# Patient Record
Sex: FEMALE | Race: WHITE
[De-identification: names, ages, dates, MRNs, and addresses within clinical notes are randomized per-mention and may not be internally consistent; named-entity substitution may affect disease eponyms.]

---

## 2019-08-04 ENCOUNTER — HOSPITAL ENCOUNTER (EMERGENCY)
Dept: HOSPITAL 26 - MED | Age: 59
Discharge: LEFT BEFORE DISCHARGE | End: 2019-08-04
Payer: MEDICAID

## 2019-08-04 ENCOUNTER — HOSPITAL ENCOUNTER (EMERGENCY)
Dept: HOSPITAL 26 - MED | Age: 59
Discharge: HOME | End: 2019-08-04
Payer: MEDICAID

## 2019-08-04 VITALS — DIASTOLIC BLOOD PRESSURE: 70 MMHG | SYSTOLIC BLOOD PRESSURE: 116 MMHG

## 2019-08-04 VITALS — BODY MASS INDEX: 17.19 KG/M2 | WEIGHT: 97 LBS | HEIGHT: 63 IN

## 2019-08-04 VITALS — DIASTOLIC BLOOD PRESSURE: 77 MMHG | SYSTOLIC BLOOD PRESSURE: 116 MMHG

## 2019-08-04 DIAGNOSIS — F32.9: ICD-10-CM

## 2019-08-04 DIAGNOSIS — F41.9: Primary | ICD-10-CM

## 2019-08-04 DIAGNOSIS — Z53.21: ICD-10-CM

## 2020-09-12 ENCOUNTER — HOSPITAL ENCOUNTER (EMERGENCY)
Dept: HOSPITAL 26 - MED | Age: 60
Discharge: HOME | End: 2020-09-12
Payer: MEDICAID

## 2020-09-12 VITALS — SYSTOLIC BLOOD PRESSURE: 129 MMHG | DIASTOLIC BLOOD PRESSURE: 84 MMHG

## 2020-09-12 VITALS — HEIGHT: 62 IN | WEIGHT: 110 LBS | BODY MASS INDEX: 20.24 KG/M2

## 2020-09-12 VITALS — SYSTOLIC BLOOD PRESSURE: 134 MMHG | DIASTOLIC BLOOD PRESSURE: 89 MMHG

## 2020-09-12 DIAGNOSIS — F41.9: ICD-10-CM

## 2020-09-12 DIAGNOSIS — R42: Primary | ICD-10-CM

## 2020-09-12 DIAGNOSIS — F32.9: ICD-10-CM

## 2020-09-12 NOTE — NUR
c/o anxiety----sudden onset of dizziness, weakness to knees x today

pt admits she feels this way everyday---denies si/hi or visual/auditory 
hallucinations

## 2020-09-12 NOTE — NUR
-------------------------------------------------------------------------------

            *** Note jaimeone in EDM - 09/12/20 at 1444 by YAKOV ***             

-------------------------------------------------------------------------------

Patient discharged with v/s stable. Written and verbal after care instructions 
given and explained. 

Patient alert, oriented and verbalized understanding of instructions. 
Ambulatory with steady gait. All questions addressed prior to discharge. ID 
band removed. Patient advised to follow up with PMD. Rx of XANAX given. Patient 
educated on indication of medication including possible reaction and side 
effects. Opportunity to ask questions provided and answered.

## 2020-12-17 ENCOUNTER — HOSPITAL ENCOUNTER (EMERGENCY)
Dept: HOSPITAL 26 - MED | Age: 60
Discharge: HOME | End: 2020-12-17
Payer: MEDICAID

## 2020-12-17 VITALS — DIASTOLIC BLOOD PRESSURE: 61 MMHG | SYSTOLIC BLOOD PRESSURE: 115 MMHG

## 2020-12-17 VITALS — WEIGHT: 108 LBS | BODY MASS INDEX: 19.14 KG/M2 | HEIGHT: 63 IN

## 2020-12-17 DIAGNOSIS — F32.9: ICD-10-CM

## 2020-12-17 DIAGNOSIS — F41.9: ICD-10-CM

## 2020-12-17 DIAGNOSIS — K64.8: Primary | ICD-10-CM

## 2020-12-17 NOTE — NUR
59 YO F C/O RECTAL BLEEDING X 2 DAYS. PT ALSO REPORTS LOWER ABDOMINAL CRAMPING 
2/10. DENIES N/V/D. NO URINARY SYMPTOMS REPORTED. IN ED, VSS. AOX4. CLEAR 
BREATH SOUNDS. ABDOMEN SOFT NONTENDER, ACTIVE BOWEL SOUNDS ON ALL QUADRANTS. PT 
SITTING ON BED. ERMD MADE AWARE OF PT STATUS.





PMH: DEPRESSION, ANXIETY, PANIC DO

NKA

## 2020-12-17 NOTE — NUR
Patient discharged with v/s stable. Written and verbal after care instructions 
given and explained. 

Patient alert, oriented and verbalized understanding of instructions. 
Ambulatory with steady gait. All questions addressed prior to discharge. ID 
band removed. Patient advised to follow up with PMD. Rx of Hydrocortisone 
Acetate, Ranitidine given. Patient educated on indication of medication 
including possible reaction and side effects. Opportunity to ask questions 
provided and answered.

## 2022-07-17 ENCOUNTER — HOSPITAL ENCOUNTER (EMERGENCY)
Dept: HOSPITAL 26 - MED | Age: 62
Discharge: HOME | End: 2022-07-17
Payer: MEDICAID

## 2022-07-17 VITALS — WEIGHT: 131 LBS | BODY MASS INDEX: 23.21 KG/M2 | HEIGHT: 63 IN

## 2022-07-17 VITALS — SYSTOLIC BLOOD PRESSURE: 118 MMHG | DIASTOLIC BLOOD PRESSURE: 73 MMHG

## 2022-07-17 DIAGNOSIS — K85.90: Primary | ICD-10-CM

## 2022-07-17 LAB
ALBUMIN FLD-MCNC: 3.4 G/DL (ref 3.4–5)
ANION GAP SERPL CALCULATED.3IONS-SCNC: 14.2 MMOL/L (ref 8–16)
AST SERPL-CCNC: 20 U/L (ref 15–37)
BASOPHILS # BLD AUTO: 0 K/UL (ref 0–0.22)
BASOPHILS NFR BLD AUTO: 0.1 % (ref 0–2)
BILIRUB SERPL-MCNC: 0.5 MG/DL (ref 0–1)
BUN SERPL-MCNC: 17 MG/DL (ref 7–18)
CHLORIDE SERPL-SCNC: 101 MMOL/L (ref 98–107)
CO2 SERPL-SCNC: 26.1 MMOL/L (ref 21–32)
CREAT SERPL-MCNC: 0.6 MG/DL (ref 0.6–1.3)
EOSINOPHIL # BLD AUTO: 0 K/UL (ref 0–0.4)
EOSINOPHIL NFR BLD AUTO: 0.2 % (ref 0–4)
ERYTHROCYTE [DISTWIDTH] IN BLOOD BY AUTOMATED COUNT: 13.8 % (ref 11.6–13.7)
GFR SERPL CREATININE-BSD FRML MDRD: 130 ML/MIN (ref 90–?)
GLUCOSE SERPL-MCNC: 118 MG/DL (ref 74–106)
HCT VFR BLD AUTO: 60 % (ref 36–48)
HGB BLD-MCNC: 20 G/DL (ref 12–16)
LIPASE SERPL-CCNC: 757 U/L (ref 73–393)
LYMPHOCYTES # BLD AUTO: 1.7 K/UL (ref 2.5–16.5)
LYMPHOCYTES NFR BLD AUTO: 15.8 % (ref 20.5–51.1)
MCH RBC QN AUTO: 32 PG (ref 27–31)
MCHC RBC AUTO-ENTMCNC: 33 G/DL (ref 33–37)
MCV RBC AUTO: 95.7 FL (ref 80–94)
MONOCYTES # BLD AUTO: 1.9 K/UL (ref 0.8–1)
MONOCYTES NFR BLD AUTO: 17.5 % (ref 1.7–9.3)
NEUTROPHILS # BLD AUTO: 7.2 K/UL (ref 1.8–7.7)
NEUTROPHILS NFR BLD AUTO: 66.4 % (ref 42.2–75.2)
PLATELET # BLD AUTO: 193 K/UL (ref 140–450)
POTASSIUM SERPL-SCNC: 3.3 MMOL/L (ref 3.5–5.1)
RBC # BLD AUTO: 6.26 MIL/UL (ref 4.2–5.4)
SODIUM SERPL-SCNC: 138 MMOL/L (ref 136–145)
WBC # BLD AUTO: 10.9 K/UL (ref 4.8–10.8)

## 2022-07-17 NOTE — NUR
Patient discharged with v/s stable. Written and verbal after care instructions 
given and explained. 

Patient alert, oriented and verbalized understanding of instructions. 
Ambulatory with steady gait. All questions addressed prior to discharge. ID 
band removed. Patient advised to follow up with PMD. Rx of HYDROCODON

BENTYL

PEPCID given. Patient educated on indication of medication including possible 
reaction and side effects. Opportunity to ask questions provided and answered.